# Patient Record
Sex: MALE | Race: WHITE | NOT HISPANIC OR LATINO | Employment: OTHER | URBAN - METROPOLITAN AREA
[De-identification: names, ages, dates, MRNs, and addresses within clinical notes are randomized per-mention and may not be internally consistent; named-entity substitution may affect disease eponyms.]

---

## 2017-11-17 ENCOUNTER — APPOINTMENT (OUTPATIENT)
Dept: OTHER | Facility: IMAGING CENTER | Age: 67
End: 2017-11-17
Payer: MEDICARE

## 2017-12-04 ENCOUNTER — APPOINTMENT (OUTPATIENT)
Dept: RADIOLOGY | Facility: MEDICAL CENTER | Age: 67
End: 2017-12-04
Attending: EMERGENCY MEDICINE
Payer: MEDICARE

## 2017-12-04 ENCOUNTER — HOSPITAL ENCOUNTER (OUTPATIENT)
Facility: MEDICAL CENTER | Age: 67
End: 2017-12-05
Attending: EMERGENCY MEDICINE | Admitting: FAMILY MEDICINE
Payer: MEDICARE

## 2017-12-04 ENCOUNTER — APPOINTMENT (OUTPATIENT)
Dept: RADIOLOGY | Facility: MEDICAL CENTER | Age: 67
End: 2017-12-04
Attending: FAMILY MEDICINE
Payer: MEDICARE

## 2017-12-04 ENCOUNTER — RESOLUTE PROFESSIONAL BILLING HOSPITAL PROF FEE (OUTPATIENT)
Dept: HOSPITALIST | Facility: MEDICAL CENTER | Age: 67
End: 2017-12-04
Payer: MEDICARE

## 2017-12-04 DIAGNOSIS — R55 SYNCOPE, UNSPECIFIED SYNCOPE TYPE: ICD-10-CM

## 2017-12-04 LAB
ALBUMIN SERPL BCP-MCNC: 3.9 G/DL (ref 3.2–4.9)
ALBUMIN/GLOB SERPL: 1.4 G/DL
ALP SERPL-CCNC: 48 U/L (ref 30–99)
ALT SERPL-CCNC: 14 U/L (ref 2–50)
ANION GAP SERPL CALC-SCNC: 11 MMOL/L (ref 0–11.9)
APTT PPP: 22.5 SEC (ref 24.7–36)
AST SERPL-CCNC: 16 U/L (ref 12–45)
BASOPHILS # BLD AUTO: 0.3 % (ref 0–1.8)
BASOPHILS # BLD: 0.02 K/UL (ref 0–0.12)
BILIRUB SERPL-MCNC: 0.8 MG/DL (ref 0.1–1.5)
BNP SERPL-MCNC: 23 PG/ML (ref 0–100)
BUN SERPL-MCNC: 10 MG/DL (ref 8–22)
CALCIUM SERPL-MCNC: 8.7 MG/DL (ref 8.5–10.5)
CHLORIDE SERPL-SCNC: 102 MMOL/L (ref 96–112)
CO2 SERPL-SCNC: 23 MMOL/L (ref 20–33)
CREAT SERPL-MCNC: 0.79 MG/DL (ref 0.5–1.4)
EOSINOPHIL # BLD AUTO: 0.24 K/UL (ref 0–0.51)
EOSINOPHIL NFR BLD: 3.8 % (ref 0–6.9)
ERYTHROCYTE [DISTWIDTH] IN BLOOD BY AUTOMATED COUNT: 43.5 FL (ref 35.9–50)
EST. AVERAGE GLUCOSE BLD GHB EST-MCNC: 111 MG/DL
GFR SERPL CREATININE-BSD FRML MDRD: >60 ML/MIN/1.73 M 2
GLOBULIN SER CALC-MCNC: 2.7 G/DL (ref 1.9–3.5)
GLUCOSE SERPL-MCNC: 91 MG/DL (ref 65–99)
HBA1C MFR BLD: 5.5 % (ref 0–5.6)
HCT VFR BLD AUTO: 44.8 % (ref 42–52)
HGB BLD-MCNC: 15.6 G/DL (ref 14–18)
IMM GRANULOCYTES # BLD AUTO: 0.04 K/UL (ref 0–0.11)
IMM GRANULOCYTES NFR BLD AUTO: 0.6 % (ref 0–0.9)
INR PPP: 1.07 (ref 0.87–1.13)
LIPASE SERPL-CCNC: 12 U/L (ref 11–82)
LYMPHOCYTES # BLD AUTO: 0.48 K/UL (ref 1–4.8)
LYMPHOCYTES NFR BLD: 7.5 % (ref 22–41)
MCH RBC QN AUTO: 32.8 PG (ref 27–33)
MCHC RBC AUTO-ENTMCNC: 34.8 G/DL (ref 33.7–35.3)
MCV RBC AUTO: 94.1 FL (ref 81.4–97.8)
MONOCYTES # BLD AUTO: 0.57 K/UL (ref 0–0.85)
MONOCYTES NFR BLD AUTO: 8.9 % (ref 0–13.4)
NEUTROPHILS # BLD AUTO: 5.03 K/UL (ref 1.82–7.42)
NEUTROPHILS NFR BLD: 78.9 % (ref 44–72)
NRBC # BLD AUTO: 0 K/UL
NRBC BLD AUTO-RTO: 0 /100 WBC
PLATELET # BLD AUTO: 160 K/UL (ref 164–446)
PMV BLD AUTO: 11.6 FL (ref 9–12.9)
POTASSIUM SERPL-SCNC: 3.7 MMOL/L (ref 3.6–5.5)
PROT SERPL-MCNC: 6.6 G/DL (ref 6–8.2)
PROTHROMBIN TIME: 13.6 SEC (ref 12–14.6)
RBC # BLD AUTO: 4.76 M/UL (ref 4.7–6.1)
SODIUM SERPL-SCNC: 136 MMOL/L (ref 135–145)
TROPONIN I SERPL-MCNC: 0.03 NG/ML (ref 0–0.04)
TSH SERPL DL<=0.005 MIU/L-ACNC: 4.64 UIU/ML (ref 0.3–3.7)
WBC # BLD AUTO: 6.4 K/UL (ref 4.8–10.8)

## 2017-12-04 PROCEDURE — 85610 PROTHROMBIN TIME: CPT

## 2017-12-04 PROCEDURE — 36415 COLL VENOUS BLD VENIPUNCTURE: CPT

## 2017-12-04 PROCEDURE — 70450 CT HEAD/BRAIN W/O DYE: CPT

## 2017-12-04 PROCEDURE — 99220 PR INITIAL OBSERVATION CARE,LEVL III: CPT | Performed by: FAMILY MEDICINE

## 2017-12-04 PROCEDURE — 84443 ASSAY THYROID STIM HORMONE: CPT

## 2017-12-04 PROCEDURE — 70551 MRI BRAIN STEM W/O DYE: CPT

## 2017-12-04 PROCEDURE — 83880 ASSAY OF NATRIURETIC PEPTIDE: CPT

## 2017-12-04 PROCEDURE — 85025 COMPLETE CBC W/AUTO DIFF WBC: CPT

## 2017-12-04 PROCEDURE — 84484 ASSAY OF TROPONIN QUANT: CPT

## 2017-12-04 PROCEDURE — 700105 HCHG RX REV CODE 258: Performed by: FAMILY MEDICINE

## 2017-12-04 PROCEDURE — 83690 ASSAY OF LIPASE: CPT

## 2017-12-04 PROCEDURE — 80053 COMPREHEN METABOLIC PANEL: CPT

## 2017-12-04 PROCEDURE — 99285 EMERGENCY DEPT VISIT HI MDM: CPT

## 2017-12-04 PROCEDURE — G0378 HOSPITAL OBSERVATION PER HR: HCPCS

## 2017-12-04 PROCEDURE — 71010 DX-CHEST-LIMITED (1 VIEW): CPT

## 2017-12-04 PROCEDURE — 304561 HCHG STAT O2

## 2017-12-04 PROCEDURE — 83036 HEMOGLOBIN GLYCOSYLATED A1C: CPT

## 2017-12-04 PROCEDURE — 85730 THROMBOPLASTIN TIME PARTIAL: CPT

## 2017-12-04 PROCEDURE — 93005 ELECTROCARDIOGRAM TRACING: CPT

## 2017-12-04 RX ORDER — B-COMPLEX WITH VITAMIN C
1 TABLET ORAL EVERY MORNING
COMMUNITY

## 2017-12-04 RX ORDER — BISACODYL 10 MG
10 SUPPOSITORY, RECTAL RECTAL
Status: DISCONTINUED | OUTPATIENT
Start: 2017-12-04 | End: 2017-12-05 | Stop reason: HOSPADM

## 2017-12-04 RX ORDER — POLYETHYLENE GLYCOL 3350 17 G/17G
1 POWDER, FOR SOLUTION ORAL
Status: DISCONTINUED | OUTPATIENT
Start: 2017-12-04 | End: 2017-12-05 | Stop reason: HOSPADM

## 2017-12-04 RX ORDER — ONDANSETRON 4 MG/1
4 TABLET, ORALLY DISINTEGRATING ORAL EVERY 4 HOURS PRN
Status: DISCONTINUED | OUTPATIENT
Start: 2017-12-04 | End: 2017-12-05 | Stop reason: HOSPADM

## 2017-12-04 RX ORDER — SODIUM CHLORIDE 9 MG/ML
INJECTION, SOLUTION INTRAVENOUS CONTINUOUS
Status: DISCONTINUED | OUTPATIENT
Start: 2017-12-04 | End: 2017-12-05 | Stop reason: HOSPADM

## 2017-12-04 RX ORDER — ONDANSETRON 2 MG/ML
4 INJECTION INTRAMUSCULAR; INTRAVENOUS EVERY 4 HOURS PRN
Status: DISCONTINUED | OUTPATIENT
Start: 2017-12-04 | End: 2017-12-05 | Stop reason: HOSPADM

## 2017-12-04 RX ORDER — ALBUTEROL SULFATE 90 UG/1
2 AEROSOL, METERED RESPIRATORY (INHALATION) EVERY 6 HOURS PRN
COMMUNITY

## 2017-12-04 RX ORDER — ACETAMINOPHEN 325 MG/1
650 TABLET ORAL EVERY 6 HOURS PRN
Status: DISCONTINUED | OUTPATIENT
Start: 2017-12-04 | End: 2017-12-05 | Stop reason: HOSPADM

## 2017-12-04 RX ORDER — AMOXICILLIN 250 MG
2 CAPSULE ORAL 2 TIMES DAILY
Status: DISCONTINUED | OUTPATIENT
Start: 2017-12-04 | End: 2017-12-05 | Stop reason: HOSPADM

## 2017-12-04 RX ADMIN — SODIUM CHLORIDE: 9 INJECTION, SOLUTION INTRAVENOUS at 17:34

## 2017-12-04 ASSESSMENT — LIFESTYLE VARIABLES
EVER FELT BAD OR GUILTY ABOUT YOUR DRINKING: NO
ALCOHOL_USE: YES
HAVE YOU EVER FELT YOU SHOULD CUT DOWN ON YOUR DRINKING: NO
TOTAL SCORE: 0
TOTAL SCORE: 0
CONSUMPTION TOTAL: POSITIVE
HOW MANY TIMES IN THE PAST YEAR HAVE YOU HAD 5 OR MORE DRINKS IN A DAY: 0
ON A TYPICAL DAY WHEN YOU DRINK ALCOHOL HOW MANY DRINKS DO YOU HAVE: 3
HAVE PEOPLE ANNOYED YOU BY CRITICIZING YOUR DRINKING: NO
TOTAL SCORE: 0
EVER_SMOKED: YES
EVER HAD A DRINK FIRST THING IN THE MORNING TO STEADY YOUR NERVES TO GET RID OF A HANGOVER: NO
AVERAGE NUMBER OF DAYS PER WEEK YOU HAVE A DRINK CONTAINING ALCOHOL: 7

## 2017-12-04 ASSESSMENT — PAIN SCALES - GENERAL
PAINLEVEL_OUTOF10: 0

## 2017-12-04 NOTE — ED NOTES
"Chief Complaint   Patient presents with   • Syncope     [facility]. Pt at home and had syncopal event with wife witness. Pt denies any SOB or CP. Pt AAO x4. Initial EMS ECG abnormal, EKG completed here and seen by ERP. Blood pressure 127/78, pulse 70, temperature 36.5 °C (97.7 °F), resp. rate 14, height 1.753 m (5' 9\"), weight 78.9 kg (173 lb 15.1 oz), SpO2 98 %.       "

## 2017-12-04 NOTE — ED PROVIDER NOTES
ED Provider Note    CHIEF COMPLAINT  Chief Complaint   Patient presents with   • Syncope       HPI  Roland Hussein is a 67 y.o. male who presentsFor evaluation of syncope. The patient is here with his wife. He states that he worked this morning and then went home and about 10. Twice states that he said he was hungry so he made some food and he sat down but really didn't eat anything.  She knew she heard him saline and she looked over at him and he was slumped back in the chair with his head back he was pale and diaphoretic and unresponsive and she states that she thought he was dead. He had no seizure activity. She states that he was completely unremarkable responsive for a minute or more. When he gradually became responsive he was able to sit up it was too weak to stand and he seemed confused. It sounds like he has had multiple episodes of syncope throughout his life but none for quite a few years. He states he feels a little tired now but absolutely fine otherwise.    REVIEW OF SYSTEMS  See HPI for further details. All other systems are negative.     PAST MEDICAL HISTORY  History reviewed. No pertinent past medical history.    FAMILY HISTORY  History reviewed. No pertinent family history.    SOCIAL HISTORY  Social History     Social History   • Marital status:      Spouse name: N/A   • Number of children: N/A   • Years of education: N/A     Social History Main Topics   • Smoking status: Never Smoker   • Smokeless tobacco: Never Used   • Alcohol use Yes      Comment: occ   • Drug use: No   • Sexual activity: Not on file     Other Topics Concern   • Not on file     Social History Narrative   • No narrative on file       SURGICAL HISTORY  History reviewed. No pertinent surgical history.    CURRENT MEDICATIONS  Home Medications    **Home medications have not yet been reviewed for this encounter**         ALLERGIES  No Known Allergies    PHYSICAL EXAM  VITAL SIGNS: /78   Pulse 84   Temp 36.5 °C (97.7  "°F)   Resp (!) 22   Ht 1.753 m (5' 9\")   Wt 78.9 kg (173 lb 15.1 oz)   SpO2 95%   BMI 25.69 kg/m²     Constitutional: Well developed, Well nourished, No acute distress, Non-toxic appearance.   HENT: Normocephalic, Atraumatic.   Eyes: PERRL, EOMI, Conjunctiva normal, No discharge.   Cardiovascular: Normal heart rate, Normal rhythm, No murmurs, No rubs, No gallops.   Thorax & Lungs: Lungs clear to auscultation bilaterally without wheezes, rales or rhonchi. No respiratory distress.    Abdomen:Soft and nontender.  Skin: Warm, Dry.   Extremities:  No edema, No cyanosis. No calf tenderness or swelling.  Musculoskeletal: Good range of motion in all major joints.  Neurologic: Awake alert and oriented x 3. Cranial nerves II through XII are intact. Normal motor function, No focal deficits noted.       EKG  EKG Interpretation    Interpreted by emergency department physician    Rhythm: normal sinus   Rate: normal  Axis: normal  Ectopy: none  Conduction: normal  ST Segments: normal  T Waves: normal  Q Waves: none    Clinical Impression: no acute changes        RADIOLOGY/PROCEDURES  DX-CHEST-LIMITED (1 VIEW)   Final Result      Mild bilateral lung base atelectasis with mild edema or pneumonitis not excluded.      CT-HEAD W/O    (Results Pending)         COURSE & MEDICAL DECISION MAKING  Pertinent Labs & Imaging studies reviewed. (See chart for details)  This is a 67-year-old here for evaluation of syncope. Currently he feels somewhat tired but otherwise is feeling fine. He has syncopal episode while sitting today. This was witnessed by his wife. He had no seizure activity. His evaluation here includes an EKG which is normal. Chest x-ray shows some basilar atelectasis. Laboratories include a troponin I which is normal. BMP is normal. CBC and chemistries are normal. The patient has been monitored throughout his stay in the emergency Department. There has been no noted ectopy. I discussed results of the test with the patient. " I'm very concerned about this patient having a syncopal episode while sitting. Concerned that this likely represents a dysrhythmia in this patient. I discussed case with Dr. Monteiro of the hospitalist service and he will be the primary admitting physician.    FINAL IMPRESSION  1. Syncope  2. Thrombocytopenia  3.         Electronically signed by: Mu Waters, 12/4/2017 1:05 PM

## 2017-12-05 VITALS
RESPIRATION RATE: 16 BRPM | OXYGEN SATURATION: 94 % | BODY MASS INDEX: 25.76 KG/M2 | DIASTOLIC BLOOD PRESSURE: 89 MMHG | SYSTOLIC BLOOD PRESSURE: 143 MMHG | TEMPERATURE: 98.4 F | HEIGHT: 69 IN | WEIGHT: 173.94 LBS | HEART RATE: 74 BPM

## 2017-12-05 PROBLEM — R55 NEAR SYNCOPE: Status: RESOLVED | Noted: 2017-12-04 | Resolved: 2017-12-05

## 2017-12-05 LAB
ANION GAP SERPL CALC-SCNC: 7 MMOL/L (ref 0–11.9)
BASOPHILS # BLD AUTO: 0.7 % (ref 0–1.8)
BASOPHILS # BLD: 0.03 K/UL (ref 0–0.12)
BUN SERPL-MCNC: 10 MG/DL (ref 8–22)
CALCIUM SERPL-MCNC: 8.7 MG/DL (ref 8.5–10.5)
CHLORIDE SERPL-SCNC: 105 MMOL/L (ref 96–112)
CHOLEST SERPL-MCNC: 202 MG/DL (ref 100–199)
CO2 SERPL-SCNC: 23 MMOL/L (ref 20–33)
CREAT SERPL-MCNC: 0.64 MG/DL (ref 0.5–1.4)
EOSINOPHIL # BLD AUTO: 0.39 K/UL (ref 0–0.51)
EOSINOPHIL NFR BLD: 8.8 % (ref 0–6.9)
ERYTHROCYTE [DISTWIDTH] IN BLOOD BY AUTOMATED COUNT: 43 FL (ref 35.9–50)
GFR SERPL CREATININE-BSD FRML MDRD: >60 ML/MIN/1.73 M 2
GLUCOSE SERPL-MCNC: 85 MG/DL (ref 65–99)
HCT VFR BLD AUTO: 44.1 % (ref 42–52)
HDLC SERPL-MCNC: 65 MG/DL
HGB BLD-MCNC: 15.4 G/DL (ref 14–18)
IMM GRANULOCYTES # BLD AUTO: 0.01 K/UL (ref 0–0.11)
IMM GRANULOCYTES NFR BLD AUTO: 0.2 % (ref 0–0.9)
LDLC SERPL CALC-MCNC: 124 MG/DL
LV EJECT FRACT  99904: 75
LYMPHOCYTES # BLD AUTO: 0.71 K/UL (ref 1–4.8)
LYMPHOCYTES NFR BLD: 16 % (ref 22–41)
MCH RBC QN AUTO: 32.3 PG (ref 27–33)
MCHC RBC AUTO-ENTMCNC: 34.9 G/DL (ref 33.7–35.3)
MCV RBC AUTO: 92.5 FL (ref 81.4–97.8)
MONOCYTES # BLD AUTO: 0.59 K/UL (ref 0–0.85)
MONOCYTES NFR BLD AUTO: 13.3 % (ref 0–13.4)
NEUTROPHILS # BLD AUTO: 2.7 K/UL (ref 1.82–7.42)
NEUTROPHILS NFR BLD: 61 % (ref 44–72)
NRBC # BLD AUTO: 0 K/UL
NRBC BLD AUTO-RTO: 0 /100 WBC
PLATELET # BLD AUTO: 158 K/UL (ref 164–446)
PMV BLD AUTO: 10.3 FL (ref 9–12.9)
POTASSIUM SERPL-SCNC: 3.9 MMOL/L (ref 3.6–5.5)
RBC # BLD AUTO: 4.77 M/UL (ref 4.7–6.1)
SODIUM SERPL-SCNC: 135 MMOL/L (ref 135–145)
TRIGL SERPL-MCNC: 66 MG/DL (ref 0–149)
WBC # BLD AUTO: 4.4 K/UL (ref 4.8–10.8)

## 2017-12-05 PROCEDURE — 93306 TTE W/DOPPLER COMPLETE: CPT | Mod: 26 | Performed by: INTERNAL MEDICINE

## 2017-12-05 PROCEDURE — 93880 EXTRACRANIAL BILAT STUDY: CPT

## 2017-12-05 PROCEDURE — 93880 EXTRACRANIAL BILAT STUDY: CPT | Mod: 26 | Performed by: SURGERY

## 2017-12-05 PROCEDURE — 80061 LIPID PANEL: CPT

## 2017-12-05 PROCEDURE — 99217 PR OBSERVATION CARE DISCHARGE: CPT | Performed by: HOSPITALIST

## 2017-12-05 PROCEDURE — G0378 HOSPITAL OBSERVATION PER HR: HCPCS

## 2017-12-05 PROCEDURE — 93306 TTE W/DOPPLER COMPLETE: CPT

## 2017-12-05 PROCEDURE — 85025 COMPLETE CBC W/AUTO DIFF WBC: CPT

## 2017-12-05 PROCEDURE — 80048 BASIC METABOLIC PNL TOTAL CA: CPT

## 2017-12-05 ASSESSMENT — PAIN SCALES - GENERAL: PAINLEVEL_OUTOF10: 0

## 2017-12-05 ASSESSMENT — LIFESTYLE VARIABLES: EVER_SMOKED: YES

## 2017-12-05 NOTE — DISCHARGE SUMMARY
Hospital Medicine Discharge Note     Patient ID:  Roland Hussein  [id number]  67 y.o.male  1950    Admit Date:  12/4/2017       Discharge Date:   12/05/2017     Primary Care Provider: No primary care provider on file.    Admitting Physician: Abelino Zhu M.D.  Discharging Physician: Nicolette Cortes M.D.     Chief Complaint: Near syncope    Discharge Diagnoses:   Active Problems:    Near syncope- Resolve no recurrence of symptoms.       Chronic Medical Problems:  History reviewed. No pertinent past medical history.    Code Status: Full Code    Hospital Summary:       Please refer to H&P by Abelino Zhu M.D on 12/04/2017 for full details.      In brief, Roland Hussein is a 67 y.o. male who was admitted 12/4/2017 for near syncope.  [facility], he has a history of vitamin D deficiency and hyperlipidemia.   Patient is a  who had returned home after looking at some houses around 10 Am, when he suddenly felt dizzy and lightheaded and nearly passed out. He laid on the floor for about 5-6 minutes, he states he did not hit his head. Patient had previous episode similar to this about 20 years ago and was concerned so he presented to the ER for evaluation.    Patient was admitted to CDU in observation status. He was medically optimized with  IV fluids, and symptom management. He was monitored on telemetry without any acute events. MRI brain, echocardiogram, carotid ultrasound were all evaluated and found to be negative with no abnormal results. Lipid panel was checked and is borderline elevated, patient takes red yeast rice supplement to help with this outpatient, and will follow-up with his PCP. TSH was also evaluated and found to be elevated at 4.64. Spoke with patient at length about this and he will be following up with his PCP for reevaluation and possible medical management. No clear cardiac or neurological source of his near syncope was determined. His  symptoms have resolved at this point, and he states he is wanting to go home.  On exam this morning patient states he has no chest pain, headache, shortness of breath, abdominal pain, nausea, vomiting or dizziness. Patient states complete resolution of all symptoms. His physical exam is within normal limits. Discussed with patient regarding his hyperlipidemia and elevated TSH and he will be following up with his PCP for close monitoring.     Therefore, he is discharged in good and stable condition with close outpatient follow-up.    Consultants:      None    Studies:    Imaging/ Testing:      CAROTID DUPLEX ([facility] [place] Only)   Final Result   CONCLUSIONS   minimal bilateral ICA thickening   nl bilateral subclavians and vertebrals   ECHOCARDIOGRAM COMP W/O CONT   Final Result   CONCLUSIONS  No prior study is available for comparison.   Left ventricle is small in size.  Normal left ventricular wall thickness, systolic function, and   diastolic function.  Left ventricular ejection fraction is estimated to be >75%.  Grossly normal regional wall motion.  Structurally normal aortic and mitral valve without significant   stenosis or regurgitation.  Trace tricuspid regurgitation.  Estimated right ventricular systolic pressure  is 30 mmHg.  Normal inferior vena cava size and inspiratory collapse.  Normal pericardium without effusion.   MR-BRAIN-W/O   Final Result      1.  MRI of the brain without contrast within normal limits for age with mild atrophy and mild white matter changes.   2.  Chronic sinus disease with prior BILATERAL maxillary antrostomy      CT-HEAD W/O   Final Result      1. Mild cerebral atrophy.      2. Subtle white matter lucencies most consistent with small vessel ischemic change versus demyelination or gliosis.      3.  Otherwise, Head CT without contrast with no acute findings. No evidence of acute cerebral  hemorrhage or mass lesion.      DX-CHEST-LIMITED (1 VIEW)   Final Result      Mild  bilateral lung base atelectasis with mild edema or pneumonitis not excluded.            Laboratory:   Recent Labs      12/04/17   1142  12/05/17   0551   WBC  6.4  4.4*   RBC  4.76  4.77   HEMOGLOBIN  15.6  15.4   HEMATOCRIT  44.8  44.1   MCV  94.1  92.5   MCH  32.8  32.3   MCHC  34.8  34.9   RDW  43.5  43.0   PLATELETCT  160*  158*   MPV  11.6  10.3       Recent Labs      12/04/17   1142  12/05/17   0551   SODIUM  136  135   POTASSIUM  3.7  3.9   CHLORIDE  102  105   CO2  23  23   GLUCOSE  91  85   BUN  10  10   CREATININE  0.79  0.64   CALCIUM  8.7  8.7       Recent Labs      12/04/17   1142  12/05/17   0551   ALTSGPT  14   --    ASTSGOT  16   --    ALKPHOSPHAT  48   --    TBILIRUBIN  0.8   --    LIPASE  12   --    GLUCOSE  91  85        Recent Labs      12/04/17   1142   BNPBTYPENAT  23       Recent Labs      12/05/17   0551   TRIGLYCERIDE  66   HDL  65   LDL  124*       Recent Labs      12/04/17   1142   TROPONINI  0.03       Recent Labs      12/04/17   1142   TSHULTRASEN  4.640*         Procedures/Surgeries:        none    Disposition:    Discharge home with wife    Condition:  Stable    Instructions:   Activity: As tolerated.  Diet: Regular   Followup: With PCP  Instructions:  -Please discuss with your PCP elevated TSH and Lipids   -Given instructions to return to the ER if any new or worsening symptoms, worsening condition, or failure to improve  -Call PCP for followup  -No smoking, no alcohol, no caffeine  -Encourage risk factor reduction with tobacco and alcohol abstinence, diet changes, weight loss, and exercise.     Follow-Up  No follow-up provider specified.  [facility].     Discharge Medications:           Medication List      CONTINUE taking these medications      Instructions   albuterol 108 (90 Base) MCG/ACT Aers inhalation aerosol   Inhale 2 Puffs by mouth every 6 hours as needed for Shortness of Breath.  Dose:  2 Puff     RED YEAST RICE PO   Take  1 Cap by mouth every morning.  Dose:  1 Cap     Vitamin B Complex Tabs   Take 1 Tab by mouth every morning.  Dose:  1 Tab     vitamin D 1000 UNIT Tabs  Commonly known as:  cholecalciferol   Take 1,000 Units by mouth every morning.  Dose:  1000 Units            Please CC the above physicians    ADIEL Diallo  12/5/2017  11:33 AM

## 2017-12-05 NOTE — DISCHARGE INSTRUCTIONS
Discharge Instructions    Discharged to home by car with relative. Discharged via wheelchair, hospital escort: Yes.  Special equipment needed: Not Applicable    Be sure to schedule a follow-up appointment with your primary care doctor or any specialists as instructed.     Discharge Plan:   Diet Plan: Discussed  Activity Level: Discussed  Smoking Cessation Offered: Patient Refused  Confirmed Follow up Appointment: Patient to Call and Schedule Appointment  Confirmed Symptoms Management: Discussed  Medication Reconciliation Updated: Yes  Influenza Vaccine Indication: Patient Refuses    I understand that a diet low in cholesterol, fat, and sodium is recommended for good health. Unless I have been given specific instructions below for another diet, I accept this instruction as my diet prescription.   Other diet: Regular    Special Instructions: None    · Is patient discharged on Warfarin / Coumadin?   No     · Is patient Post Blood Transfusion?  No    Depression / Suicide Risk    As you are discharged from this [facility] facility, it is important to learn how to keep safe from harming yourself.    Recognize the warning signs:  · Abrupt changes in personality, positive or negative- including increase in energy   · Giving away possessions  · Change in eating patterns- significant weight changes-  positive or negative  · Change in sleeping patterns- unable to sleep or sleeping all the time   · Unwillingness or inability to communicate  · Depression  · Unusual sadness, discouragement and loneliness  · Talk of wanting to die  · Neglect of personal appearance   · Rebelliousness- reckless behavior  · Withdrawal from people/activities they love  · Confusion- inability to concentrate     If you or a loved one observes any of these behaviors or has concerns about self-harm, here's what you can do:  · Talk about it- your feelings and reasons for harming yourself  · Remove any means that you might use to hurt yourself (examples:  pills, rope, extension cords, firearm)  · Get professional help from the community (Mental Health, Substance Abuse, psychological counseling)  · Do not be alone:Call your Safe Contact- someone whom you trust who will be there for you.  · Call your local CRISIS HOTLINE 941-6956 or 911-952-8199  · Call your local Children's Mobile Crisis Response Team [place] (133) 819-6929 or www.[employer]  · Call the toll free National Suicide Prevention Hotlines   · National Suicide Prevention Lifeline 497-103-XCCF (6918)  · National Hope Line Network 800-SUICIDE (523-7354)

## 2017-12-05 NOTE — PROGRESS NOTES
Pt to unit via wheelchair. Pt ambulate to bed with steady gait. Pt denies any dizziness. Tele monitor on. Pt educated on Lovenox; pt refuse. MRI screening sheet completed and faxed to MRI. Call light and personal belongings within reach. Pt denies any needs at this time. Will continue to monitor.

## 2017-12-05 NOTE — H&P
DOS: 12/4/2017    PATIENT ID:  NAME:  Roland Hussein  MRN:               [id number]  YOB: 1950    PMD: No primary care provider on file.    CC: Near syncope    HPI: Roland Hussein is a 67 y.o. male who presents with near syncope, patient states that  back to the house around 10 AM after looking at some job sites he is a . He was in a counter both have breakfast when he suddenly felt dizzy and lightheaded where he had nearly passed out. He was in the floor for about 5 or 6 minutes, states he did not hit his head. Denies any headache chest pain palpitations shortness of breath prior to the episode. Patient states he woke up around 5 AM this morning which is not unusual for him and sometimes he does skip breakfast. He has had these episodes happen over the years. He has not done any previous workup for this. He has had some congestion over the past 2 days but denies any fever or chills, nausea vomiting or diarrhea.                REVIEW OF SYSTEMS  A full review of systems was completed and all pertinent positives and negatives are included in the HPI above by AMA/CMS criteria.    PAST MEDICAL HISTORY  History reviewed. No pertinent past medical history.    PAST SURGICAL HISTORY  Past Surgical History:   Procedure Laterality Date   • ACL REPAIR Left    • HERNIA REPAIR         FAMILY HISTORY  History reviewed. No pertinent family history.    SOCIAL HISTORY  Social History   Substance Use Topics   • Smoking status: Never Smoker   • Smokeless tobacco: Never Used   • Alcohol use Yes      Comment: occ       ALLERGIES  Allergies as of 12/04/2017   • (No Known Allergies)       OUTPATIENT MEDICATIONS     Medication List      ASK your doctor about these medications      Instructions   albuterol 108 (90 Base) MCG/ACT Aers inhalation aerosol   Inhale 2 Puffs by mouth every 6 hours as needed for Shortness of Breath.  Dose:  2 Puff     RED YEAST RICE PO   Take 1 Cap by mouth every morning.  Dose:   "1 Cap     Vitamin B Complex Tabs   Take 1 Tab by mouth every morning.  Dose:  1 Tab     vitamin D 1000 UNIT Tabs  Commonly known as:  cholecalciferol   Take 1,000 Units by mouth every morning.  Dose:  1000 Units            PHYSICAL EXAM:  Blood pressure 127/78, pulse 78, temperature 36.5 °C (97.7 °F), resp. rate (!) 24, height 1.753 m (5' 9\"), weight 78.9 kg (173 lb 15.1 oz), SpO2 94 %.  Oxygen: Pulse Oximetry: 94 %, O2 (LPM): 2, O2 Delivery: Nasal Cannula    Gen: NAD, comfortable  HEENT: NCAT, PERRL, EOMI, Oropharynx moist and clear, no LAD, no JVD, neck supple  Chest: no accessory muscle use, CTAB  CV: RRR, S1 and S2 distinct, no murmur  GI: +BS, soft, NT, ND, no rebound/guarding, no hepatosplenomegaly  Extremities: Warm, well-perfused, no cyanosis/clubbing, no peripheral edema, distal pulses are intact  Neuro: AO x 3, CN II-XII grossly intact, MMT 5/5, no sensory deficits    LAB TESTS and IMAGES:   Recent Labs      12/04/17   1142   WBC  6.4   RBC  4.76   HEMOGLOBIN  15.6   HEMATOCRIT  44.8   MCV  94.1   MCH  32.8   RDW  43.5   PLATELETCT  160*   MPV  11.6   NEUTSPOLYS  78.90*   LYMPHOCYTES  7.50*   MONOCYTES  8.90   EOSINOPHILS  3.80   BASOPHILS  0.30     Recent Labs      12/04/17   1142   TROPONINI  0.03   BNPBTYPENAT  23     Recent Labs      12/04/17   1142   APTT  22.5*   INR  1.07     Recent Labs      12/04/17   1142   SODIUM  136   POTASSIUM  3.7   CHLORIDE  102   CO2  23   BUN  10   CREATININE  0.79   CALCIUM  8.7   ALBUMIN  3.9     Estimated GFR/CRCL = Estimated Creatinine Clearance: 88.3 mL/min (by C-G formula based on SCr of 0.79 mg/dL).  Recent Labs      12/04/17   1142   GLUCOSE  91     No results found for: HBA1C, TSH, FREET4, FREET3, CORTISOL  Recent Labs      12/04/17   1142   ASTSGOT  16   ALTSGPT  14   TBILIRUBIN  0.8   ALKPHOSPHAT  48   GLOBULIN  2.7   INR  1.07           Invalid input(s): [id number]  No results found for: OTBLLICB94, FOLATE, FERRITIN, IRON, TOTIRONBC  Ct-head W/o    Result " Date: 12/4/2017 12/4/2017 2:18 PM HISTORY/REASON FOR EXAM:  Syncope. TECHNIQUE/EXAM DESCRIPTION AND NUMBER OF VIEWS: CT of the head without contrast. The study was performed on a helical multidetector CT scanner. Contiguous 2.5 mm axial sections were obtained from the skull base through the vertex. Up to date radiation dose reduction adjustments have been utilized to meet ALARA standards for radiation dose reduction. COMPARISON:  None available FINDINGS: The calvariae and skull base are unremarkable. There are no extraaxial fluid collections. There is a pattern of cerebral atrophy manifest as enlargement of sulcal markings and ventricular prominence.  The ventricular system and basal cisterns are otherwise unremarkable. There are areas of hypodensity in the white matter most consistent with small vessel ischemic change versus demyelination or gliosis. There are no hemorrhagic lesions. There are no mass effects or shift of midline structures. Marked paranasal sinus mucosal thickening. Mastoids in the field of view are unremarkable.     1. Mild cerebral atrophy. 2. Subtle white matter lucencies most consistent with small vessel ischemic change versus demyelination or gliosis. 3.  Otherwise, Head CT without contrast with no acute findings. No evidence of acute cerebral  hemorrhage or mass lesion.    Dx-chest-limited (1 View)    Result Date: 12/4/2017 12/4/2017 12:51 PM HISTORY/REASON FOR EXAM: Syncope. TECHNIQUE/EXAM DESCRIPTION AND NUMBER OF VIEWS: Single AP view of the chest. COMPARISON: 2/8/2007 FINDINGS: Bilateral lung base atelectasis. Mild edema or pneumonitis not excluded. The heart is normal in size. There is no pleural effusion.     Mild bilateral lung base atelectasis with mild edema or pneumonitis not excluded.      ASSESSMENT/PLAN:     1.  Near syncope. Admit to monitored telemetry bed, check MRI brain, echocardiogram, carotid.  2.  Hyperlipidemia. Check lipid profile    PPX: Lovenox    CODE STATUS:  Full    Anticipate that patient will need less than 2 midnights for management of the above discussed medical issues.    This dictation was created using voice recognition software. The accuracy of the dictation is limited to the abilities of the software. I expect there may be some errors of grammar and possibly content.

## 2017-12-05 NOTE — PROGRESS NOTES
Discharge instructions, medications and follow-up reviewed with pt, pt verbalized understanding and denies questions. Discharge paperwork given to pt. PIV removed, armband removed. Pt awaiting transport.

## 2017-12-05 NOTE — PROGRESS NOTES
Pt PIV leaking. Fluids stopped. Per CUATE Chaves IV not necessary at this time, okay to remove. PIV removed.

## 2017-12-05 NOTE — PROGRESS NOTES
Pt with orders to go to MRI, paged hospitalist @ 2019.  Returned call from Dr. Barkley @ 2025.  Per MD ok for pt to be off tele for MRI.  Orders placed.  Will continue to monitor.

## 2021-01-15 DIAGNOSIS — Z23 NEED FOR VACCINATION: ICD-10-CM

## 2021-05-17 ENCOUNTER — PATIENT OUTREACH (OUTPATIENT)
Dept: HEALTH INFORMATION MANAGEMENT | Facility: OTHER | Age: 71
End: 2021-05-17

## 2021-05-17 NOTE — PROGRESS NOTES
Outreach-Comprehensive Health Assessment. Scheduled for 5/27/21 at 8:00 am with Dr. Burgess at [address] [place] [address]. Verified HIPAA, no questions or concerns. Reminder sent to home.      Attempt #1

## 2021-05-27 PROBLEM — J45.20 MILD INTERMITTENT ASTHMA WITHOUT COMPLICATION: Status: ACTIVE | Noted: 2021-05-27

## 2021-05-27 PROBLEM — J45.40 MODERATE PERSISTENT ASTHMA WITHOUT COMPLICATION: Status: ACTIVE | Noted: 2021-05-27

## 2021-05-27 PROBLEM — R94.30 NONSPECIFIC ABNORMAL FUNCTION STUDY, CARDIOVASCULAR: Status: ACTIVE | Noted: 2021-05-27

## 2022-06-20 ENCOUNTER — TELEPHONE (OUTPATIENT)
Dept: HEALTH INFORMATION MANAGEMENT | Facility: OTHER | Age: 72
End: 2022-06-20
Payer: MEDICARE

## 2022-11-08 ENCOUNTER — DOCUMENTATION (OUTPATIENT)
Dept: HEALTH INFORMATION MANAGEMENT | Facility: OTHER | Age: 72
End: 2022-11-08
Payer: MEDICARE

## 2023-03-31 ENCOUNTER — APPOINTMENT (OUTPATIENT)
Dept: RADIOLOGY | Facility: MEDICAL CENTER | Age: 73
End: 2023-03-31
Attending: EMERGENCY MEDICINE
Payer: MEDICARE

## 2023-03-31 ENCOUNTER — HOSPITAL ENCOUNTER (EMERGENCY)
Facility: MEDICAL CENTER | Age: 73
End: 2023-04-01
Attending: EMERGENCY MEDICINE
Payer: MEDICARE

## 2023-03-31 DIAGNOSIS — R55 SYNCOPE, UNSPECIFIED SYNCOPE TYPE: ICD-10-CM

## 2023-03-31 DIAGNOSIS — F19.10 POLYSUBSTANCE ABUSE (HCC): ICD-10-CM

## 2023-03-31 LAB
ALBUMIN SERPL BCP-MCNC: 3.9 G/DL (ref 3.2–4.9)
ALBUMIN/GLOB SERPL: 1.6 G/DL
ALP SERPL-CCNC: 50 U/L (ref 30–99)
ALT SERPL-CCNC: 17 U/L (ref 2–50)
ANION GAP SERPL CALC-SCNC: 12 MMOL/L (ref 7–16)
APTT PPP: 20.8 SEC (ref 24.7–36)
AST SERPL-CCNC: 20 U/L (ref 12–45)
BASOPHILS # BLD AUTO: 0.8 % (ref 0–1.8)
BASOPHILS # BLD: 0.05 K/UL (ref 0–0.12)
BILIRUB SERPL-MCNC: 0.4 MG/DL (ref 0.1–1.5)
BUN SERPL-MCNC: 14 MG/DL (ref 8–22)
CALCIUM ALBUM COR SERPL-MCNC: 8.9 MG/DL (ref 8.5–10.5)
CALCIUM SERPL-MCNC: 8.8 MG/DL (ref 8.4–10.2)
CHLORIDE SERPL-SCNC: 105 MMOL/L (ref 96–112)
CO2 SERPL-SCNC: 23 MMOL/L (ref 20–33)
CREAT SERPL-MCNC: 0.73 MG/DL (ref 0.5–1.4)
EKG IMPRESSION: NORMAL
EOSINOPHIL # BLD AUTO: 0.38 K/UL (ref 0–0.51)
EOSINOPHIL NFR BLD: 6.4 % (ref 0–6.9)
ERYTHROCYTE [DISTWIDTH] IN BLOOD BY AUTOMATED COUNT: 45.1 FL (ref 35.9–50)
ETHANOL BLD-MCNC: 64 MG/DL
GFR SERPLBLD CREATININE-BSD FMLA CKD-EPI: 95 ML/MIN/1.73 M 2
GLOBULIN SER CALC-MCNC: 2.5 G/DL (ref 1.9–3.5)
GLUCOSE SERPL-MCNC: 86 MG/DL (ref 65–99)
HCT VFR BLD AUTO: 43.8 % (ref 42–52)
HGB BLD-MCNC: 15 G/DL (ref 14–18)
IMM GRANULOCYTES # BLD AUTO: 0.06 K/UL (ref 0–0.11)
IMM GRANULOCYTES NFR BLD AUTO: 1 % (ref 0–0.9)
INR PPP: 1.01 (ref 0.87–1.13)
LYMPHOCYTES # BLD AUTO: 2.27 K/UL (ref 1–4.8)
LYMPHOCYTES NFR BLD: 38.3 % (ref 22–41)
MCH RBC QN AUTO: 32.6 PG (ref 27–33)
MCHC RBC AUTO-ENTMCNC: 34.2 G/DL (ref 33.7–35.3)
MCV RBC AUTO: 95.2 FL (ref 81.4–97.8)
MONOCYTES # BLD AUTO: 0.52 K/UL (ref 0–0.85)
MONOCYTES NFR BLD AUTO: 8.8 % (ref 0–13.4)
NEUTROPHILS # BLD AUTO: 2.65 K/UL (ref 1.82–7.42)
NEUTROPHILS NFR BLD: 44.7 % (ref 44–72)
NRBC # BLD AUTO: 0 K/UL
NRBC BLD-RTO: 0 /100 WBC
PLATELET # BLD AUTO: 179 K/UL (ref 164–446)
PMV BLD AUTO: 10.2 FL (ref 9–12.9)
POTASSIUM SERPL-SCNC: 3.7 MMOL/L (ref 3.6–5.5)
PROT SERPL-MCNC: 6.4 G/DL (ref 6–8.2)
PROTHROMBIN TIME: 13.2 SEC (ref 12–14.6)
RBC # BLD AUTO: 4.6 M/UL (ref 4.7–6.1)
SODIUM SERPL-SCNC: 140 MMOL/L (ref 135–145)
TROPONIN T SERPL-MCNC: 10 NG/L (ref 6–19)
TROPONIN T SERPL-MCNC: 10 NG/L (ref 6–19)
WBC # BLD AUTO: 5.9 K/UL (ref 4.8–10.8)

## 2023-03-31 PROCEDURE — 85730 THROMBOPLASTIN TIME PARTIAL: CPT

## 2023-03-31 PROCEDURE — 700105 HCHG RX REV CODE 258: Performed by: EMERGENCY MEDICINE

## 2023-03-31 PROCEDURE — 36415 COLL VENOUS BLD VENIPUNCTURE: CPT

## 2023-03-31 PROCEDURE — 71045 X-RAY EXAM CHEST 1 VIEW: CPT

## 2023-03-31 PROCEDURE — 99285 EMERGENCY DEPT VISIT HI MDM: CPT

## 2023-03-31 PROCEDURE — 85610 PROTHROMBIN TIME: CPT

## 2023-03-31 PROCEDURE — 70450 CT HEAD/BRAIN W/O DYE: CPT

## 2023-03-31 PROCEDURE — 84484 ASSAY OF TROPONIN QUANT: CPT | Mod: 91

## 2023-03-31 PROCEDURE — 82077 ASSAY SPEC XCP UR&BREATH IA: CPT

## 2023-03-31 PROCEDURE — 85025 COMPLETE CBC W/AUTO DIFF WBC: CPT

## 2023-03-31 PROCEDURE — 80053 COMPREHEN METABOLIC PANEL: CPT

## 2023-03-31 PROCEDURE — 93005 ELECTROCARDIOGRAM TRACING: CPT

## 2023-03-31 RX ORDER — SODIUM CHLORIDE, SODIUM LACTATE, POTASSIUM CHLORIDE, CALCIUM CHLORIDE 600; 310; 30; 20 MG/100ML; MG/100ML; MG/100ML; MG/100ML
1000 INJECTION, SOLUTION INTRAVENOUS ONCE
Status: COMPLETED | OUTPATIENT
Start: 2023-03-31 | End: 2023-03-31

## 2023-03-31 RX ADMIN — SODIUM CHLORIDE, POTASSIUM CHLORIDE, SODIUM LACTATE AND CALCIUM CHLORIDE 1000 ML: 600; 310; 30; 20 INJECTION, SOLUTION INTRAVENOUS at 22:15

## 2023-03-31 ASSESSMENT — HEART SCORE
ECG: NON-SPECIFIC REPOLARIZATION DISTURBANCE
TROPONIN: LESS THAN OR EQUAL TO NORMAL LIMIT
HEART SCORE: 3
RISK FACTORS: NO KNOWN RISK FACTORS
AGE: 65+
HISTORY: SLIGHTLY SUSPICIOUS

## 2023-04-01 VITALS
RESPIRATION RATE: 16 BRPM | SYSTOLIC BLOOD PRESSURE: 144 MMHG | HEART RATE: 75 BPM | WEIGHT: 165 LBS | HEIGHT: 69 IN | BODY MASS INDEX: 24.44 KG/M2 | DIASTOLIC BLOOD PRESSURE: 90 MMHG | TEMPERATURE: 97 F | OXYGEN SATURATION: 98 %

## 2023-04-01 NOTE — DISCHARGE INSTRUCTIONS
Do not mix marijuana and alcohol ever again.  Return for any chest pains extremity weakness dizziness shortness of breath or any worsening symptoms.

## 2023-04-01 NOTE — ED PROVIDER NOTES
"ED Provider Note    CHIEF COMPLAINT  Chief Complaint   Patient presents with    Syncope     Pt was a family event where he \"stared into space\" and then fainted per family report  Pt was assisted to by family member to keep him from falling to the ground   Pt states that the dizziness is still present and worse with movement   Pt denies any medical history or daily medications  Pt was drinking alcohol and smoking marijuana          EXTERNAL RECORDS REVIEWED  Outpatient labs & studies patient had an echocardiogram 12/2017 which showed an ejection fraction of 75% no cardiac wall dysfunction no abnormal LV thickness    HPI/ROS  LIMITATION TO HISTORY   Select: : None  OUTSIDE HISTORIAN(S):  Family the patient's daughter states that he was sitting at the table talking to her while drinking alcohol and smoking marijuana when he suddenly became very pale and looked off to the right and then had a syncopal episode.  He did not have any seizures.  She held him up to make sure that he would not fall and hurt himself.    Roland Hussein is a 72 y.o. male who presents by ambulance after having a syncopal episode the evening while drinking the ricks and smoking marijuana at the same time.  The patient states that he feels improved now.  He denies ever having any chest pain extremity swelling PND orthopnea headache unilateral weakness or numbness.  The patient has no medical problems and does not take any medications on a daily basis.  According to him and his family he is very healthy.  He states he does drink a few beers daily but does not often combine alcohol and marijuana as he did tonight.    PAST MEDICAL HISTORY       SURGICAL HISTORY   has a past surgical history that includes repair, knee, acl (Left) and hernia repair.    FAMILY HISTORY  History reviewed. No pertinent family history.    SOCIAL HISTORY  Social History     Tobacco Use    Smoking status: Former     Packs/day: 1.00     Years: 15.00     Pack years: 15.00     " "Types: Cigarettes     Quit date:      Years since quittin.2    Smokeless tobacco: Never   Vaping Use    Vaping Use: Never used   Substance and Sexual Activity    Alcohol use: Yes     Comment: occ    Drug use: Yes     Types: Inhaled     Comment: marijuana    Sexual activity: Not on file       CURRENT MEDICATIONS  Home Medications    **Home medications have not yet been reviewed for this encounter**         ALLERGIES  Allergies   Allergen Reactions    Penicillins Hives       PHYSICAL EXAM  VITAL SIGNS: /72   Pulse 62   Temp 36 °C (96.8 °F) (Temporal)   Resp 16   Ht 1.753 m (5' 9\")   Wt 74.8 kg (165 lb)   SpO2 94% BMI 24.37 kg/m²    Constitutional: Well developed, Well nourished, No acute distress, Non-toxic appearance.   HEENT: Normocephalic, Atraumatic,  external ears normal, pharynx pink,  Mucous  Membranes moist, No rhinorrhea or mucosal edema  Eyes: PERRL, EOMI, Conjunctiva normal, No discharge.   Neck: Normal range of motion, No tenderness, Supple, No stridor.   Lymphatic: No lymphadenopathy    Cardiovascular: Regular Rate and Rhythm, No murmurs,  rubs, or gallops.   Thorax & Lungs: Lungs clear to auscultation bilaterally, No respiratory distress, No wheezes, rhales or rhonchi, No chest wall tenderness.   Abdomen: Bowel sounds normal, Soft, non tender, non distended,  No pulsatile masses., no rebound guarding or peritoneal signs.   Skin: Warm, Dry, No erythema, No rash,   Back:  No CVA tenderness,  No spinal tenderness, bony crepitance step offs or instability.   Extremities: Equal, intact distal pulses, No cyanosis, clubbing or edema,  No tenderness.   Musculoskeletal: Good range of motion in all major joints. No tenderness to palpation or major deformities noted.   Neurologic: Alert & oriented x 3, Cranial nerves II-XII intact, Equal strength and sensation upper and lower extremities bilaterally,  No focal deficits noted. NIH 0  Psychiatric: Affect normal, Judgment normal, Mood normal. No " suicidal or homicidal ideation         DIAGNOSTIC STUDIES / PROCEDURES  EKG  I have independently interpreted this EKG  See below    LABS  Results for orders placed or performed during the hospital encounter of 03/31/23   CBC WITH DIFFERENTIAL   Result Value Ref Range    WBC 5.9 4.8 - 10.8 K/uL    RBC 4.60 (L) 4.70 - 6.10 M/uL    Hemoglobin 15.0 14.0 - 18.0 g/dL    Hematocrit 43.8 42.0 - 52.0 %    MCV 95.2 81.4 - 97.8 fL    MCH 32.6 27.0 - 33.0 pg    MCHC 34.2 33.7 - 35.3 g/dL    RDW 45.1 35.9 - 50.0 fL    Platelet Count 179 164 - 446 K/uL    MPV 10.2 9.0 - 12.9 fL    Neutrophils-Polys 44.70 44.00 - 72.00 %    Lymphocytes 38.30 22.00 - 41.00 %    Monocytes 8.80 0.00 - 13.40 %    Eosinophils 6.40 0.00 - 6.90 %    Basophils 0.80 0.00 - 1.80 %    Immature Granulocytes 1.00 (H) 0.00 - 0.90 %    Nucleated RBC 0.00 /100 WBC    Neutrophils (Absolute) 2.65 1.82 - 7.42 K/uL    Lymphs (Absolute) 2.27 1.00 - 4.80 K/uL    Monos (Absolute) 0.52 0.00 - 0.85 K/uL    Eos (Absolute) 0.38 0.00 - 0.51 K/uL    Baso (Absolute) 0.05 0.00 - 0.12 K/uL    Immature Granulocytes (abs) 0.06 0.00 - 0.11 K/uL    NRBC (Absolute) 0.00 K/uL   COMP METABOLIC PANEL   Result Value Ref Range    Sodium 140 135 - 145 mmol/L    Potassium 3.7 3.6 - 5.5 mmol/L    Chloride 105 96 - 112 mmol/L    Co2 23 20 - 33 mmol/L    Anion Gap 12.0 7.0 - 16.0    Glucose 86 65 - 99 mg/dL    Bun 14 8 - 22 mg/dL    Creatinine 0.73 0.50 - 1.40 mg/dL    Calcium 8.8 8.4 - 10.2 mg/dL    AST(SGOT) 20 12 - 45 U/L    ALT(SGPT) 17 2 - 50 U/L    Alkaline Phosphatase 50 30 - 99 U/L    Total Bilirubin 0.4 0.1 - 1.5 mg/dL    Albumin 3.9 3.2 - 4.9 g/dL    Total Protein 6.4 6.0 - 8.2 g/dL    Globulin 2.5 1.9 - 3.5 g/dL    A-G Ratio 1.6 g/dL   TROPONIN   Result Value Ref Range    Troponin T 10 6 - 19 ng/L   PRTOTHROMBIN TIME (INR)   Result Value Ref Range    PT 13.2 12.0 - 14.6 sec    INR 1.01 0.87 - 1.13   APTT   Result Value Ref Range    APTT 20.8 (L) 24.7 - 36.0 sec   ETHYL ALCOHOL  (BLOOD)   Result Value Ref Range    Diagnostic Alcohol 64.0 (H) <10.1 mg/dL   CORRECTED CALCIUM   Result Value Ref Range    Correct Calcium 8.9 8.5 - 10.5 mg/dL   ESTIMATED GFR   Result Value Ref Range    GFR (CKD-EPI) 95 >60 mL/min/1.73 m 2   TROPONIN   Result Value Ref Range    Troponin T 10 6 - 19 ng/L   EKG   Result Value Ref Range    Report       [facility] Emergency Dept.    Test Date:  2023  Pt Name:    JULIAN CISNEROS                Department: [facility]  MRN:        [id number]                      [facility]  Gender:     Male                         Technician: [id number]  :        1950                   Requested By:PADMINI MASON  Order #:    [id number]                    Reading MD: PADMINI MASON MD    Measurements  Intervals                                Axis  Rate:       61                           P:          9  WA:         247                          QRS:        35  QRSD:       152                          T:          6  QT:         441  QTc:        445    Interpretive Statements  Sinus rhythm  Prolonged WA interval  Right bundle branch block  Compared to ECG 2017 11:26:06  First degree AV block now present  Right bundle-branch block now present  Electronically Signed On 3- 21:47:47 PDT by PADMINI MASON MD           RADIOLOGY  I have independently interpreted the diagnostic imaging associated with this visit and am waiting the final reading from the radiologist.   My preliminary interpretation is as follows: Chest x-ray shows no infiltrates or effusions  Radiologist interpretation:   CT-HEAD W/O   Final Result         1. No acute intracranial abnormality. No evidence of acute intracranial hemorrhage or mass lesion.                     DX-CHEST-PORTABLE (1 VIEW)   Final Result         Mild bibasilar opacities, likely atelectasis.            COURSE & MEDICAL DECISION MAKING    ED Observation Status? Yes; I am placing the patient in to an observation  status due to a diagnostic uncertainty as well as therapeutic intensity. Patient placed in observation status at 9:23 PM, 3/31/2023.     Observation plan is as follows: CT head without contrast, EKG CBC CMP and troponin were all ordered to evaluate the patient's symptoms.    Upon Reevaluation, the patient's condition has: Improved; and will be discharged.    Patient discharged from ED Observation status at 12:02 AM  (Time) 4/1/23(Date).     INITIAL ASSESSMENT, COURSE AND PLAN  Care Narrative: This is a 72-year-old male who had a few beers and a shot of bourbon and then a few marijuana cigarettes tonight.  He was visiting with his daughter and became unresponsive and passed out for a few minutes.  He did not have any seizure activity.  The patient states that he thinks he just might of overdone it but denies having any chest pain headache unilateral weakness or numbness.  He currently feels like he has a dry mouth but has no other symptoms.  His daughter made sure he did not fall and hit his head when this happened.  On physical exam he has a normal neurologic exam normal lung sounds normal cardiac exam.  I have ordered CT of the head as well as EKG CBC CMP and troponin to further evaluate his symptoms.  HYDRATION: Based on the patient's presentation of Dehydration the patient was given IV fluids. IV Hydration was used because oral hydration was not adequate alone. Upon recheck following hydration, the patient was improved.      ADDITIONAL PROBLEM LIST  None  DISPOSITION AND DISCUSSIONS    Patient's white blood cell count is normal at 5.9 hemoglobin 15 platelet count 179.  His differential is normal.  Patient's diagnostic alcohol level is elevated at 64.  His comprehensive metabolic panel is normal kidney function normal liver function and normal electrolytes with no anion gap.  His coags are normal.  Troponin is 10.  His heart score at this time is 3 with a 0.9 to 1.7%  risk of MACE in the next month.    Patient's  CT head is negative for any intracranial process.  His chest x-ray shows atelectasis but no pleural effusions or aspirations.    The patient is resting comfortably on recheck with no focal deficits or chest pain.  I have spoken with his daughter and son and explained to them that as long as his second troponin is negative his orthostatics are negative and he is improved and able to ambulate around he could be discharged home this morning.  The son left his numbers with us so we can call him to come get his father if everything turns out negative.    The patient's orthostatics were negative after his IV fluid was in and he was able to ambulate to the bathroom without assistance or any symptoms.    Patient's second troponin is still 10.  He has no chest pains or shortness of breath and is no longer dizzy.  I will discharge him home in the care of his family in stable condition.  We had a discussion about never mixing marijuana and alcohol again and he is agreeable to this.  He states [facility] and return for worsening symptoms.    I have discussed management of the patient with the following physicians and GAIL's:  none    Discussion of management with other QHP or appropriate source(s):  none     Escalation of care considered, and ultimately not performed:acute inpatient care management, however at this time, the patient is most appropriate for outpatient management    Barriers to care at this time, including but not limited to: none.     Decision tools and prescription drugs considered including, but not limited to: NIH Stroke Scale 0 and HEART Score 3 .  The patient will return for new or worsening symptoms and is stable at the time of discharge.    The patient is referred to a primary physician for blood pressure management, diabetic screening, and for all other preventative health concerns.      DISPOSITION:  Patient will be discharged home in stable condition.    FOLLOW  UP:  Gerry Zhu M.D.  [address] [place] [place] [id number] [phone]    Call in 3 days  for recheck    [facility], Emergency Dept  [address]  [place] [id number] [phone]    As needed, If symptoms worsen      OUTPATIENT MEDICATIONS:  New Prescriptions    No medications on file       FINAL DIAGNOSIS  1. Syncope, unspecified syncope type    2. Polysubstance abuse (HCC)           Electronically signed by: Marilia Mckeon M.D., 3/31/2023 9:21 PM

## 2023-04-01 NOTE — ED TRIAGE NOTES
"Chief Complaint   Patient presents with    Syncope     Pt was a family event where he \"stared into space\" and then fainted per family report  Pt was assisted to by family member to keep him from falling to the ground   Pt states that the dizziness is still present and worse with movement   Pt denies any medical history or daily medications  Pt was drinking alcohol and smoking marijuana        /72   Pulse 62   Temp 36 °C (96.8 °F) (Temporal)   Resp 16   Ht 1.753 m (5' 9\")   Wt 74.8 kg (165 lb)   SpO2 94%   BMI 24.37 kg/m²     "

## 2023-04-01 NOTE — ED NOTES
Patient discharged in ambulatory state. Patient verbally confirmed understanding of discharge paperwork and education provided.

## 2023-04-01 NOTE — ED NOTES
Report given and Ovidio aware that IVFs are still running. Family and pt updated on transfer of care no questions at this time.

## 2023-04-01 NOTE — ED NOTES
Pt upon returning from CT requested lights to be turned off to be able to rest. Lights turned down, call light in reach and pt on monitor.

## 2023-08-24 ENCOUNTER — TELEPHONE (OUTPATIENT)
Dept: HEALTH INFORMATION MANAGEMENT | Facility: OTHER | Age: 73
End: 2023-08-24
Payer: MEDICARE

## 2023-10-31 ENCOUNTER — OFFICE VISIT (OUTPATIENT)
Dept: MEDICAL GROUP | Facility: PHYSICIAN GROUP | Age: 73
End: 2023-10-31
Payer: MEDICARE

## 2023-10-31 VITALS
SYSTOLIC BLOOD PRESSURE: 120 MMHG | DIASTOLIC BLOOD PRESSURE: 80 MMHG | OXYGEN SATURATION: 99 % | BODY MASS INDEX: 24.71 KG/M2 | WEIGHT: 166.8 LBS | TEMPERATURE: 97.3 F | HEIGHT: 69 IN | HEART RATE: 63 BPM

## 2023-10-31 DIAGNOSIS — J45.20 MILD INTERMITTENT ASTHMA WITHOUT COMPLICATION: ICD-10-CM

## 2023-10-31 DIAGNOSIS — H90.3 SENSORINEURAL HEARING LOSS, BILATERAL: ICD-10-CM

## 2023-10-31 DIAGNOSIS — B02.9 HERPES ZOSTER WITHOUT COMPLICATION: ICD-10-CM

## 2023-10-31 DIAGNOSIS — N52.9 VASCULOGENIC ERECTILE DYSFUNCTION, UNSPECIFIED VASCULOGENIC ERECTILE DYSFUNCTION TYPE: ICD-10-CM

## 2023-10-31 PROBLEM — H35.363 DRUSEN (DEGENERATIVE) OF MACULA, BILATERAL: Status: ACTIVE | Noted: 2023-10-31

## 2023-10-31 PROBLEM — E78.5 HYPERLIPIDEMIA: Status: ACTIVE | Noted: 2023-10-31

## 2023-10-31 PROCEDURE — 3074F SYST BP LT 130 MM HG: CPT

## 2023-10-31 PROCEDURE — 99214 OFFICE O/P EST MOD 30 MIN: CPT

## 2023-10-31 PROCEDURE — 3079F DIAST BP 80-89 MM HG: CPT

## 2023-10-31 RX ORDER — SILDENAFIL 50 MG/1
25 TABLET, FILM COATED ORAL PRN
Qty: 20 TABLET | Refills: 3 | Status: SHIPPED | OUTPATIENT
Start: 2023-10-31

## 2023-10-31 RX ORDER — SILDENAFIL 50 MG/1
25 TABLET, FILM COATED ORAL
COMMUNITY
Start: 2023-08-29 | End: 2023-10-31 | Stop reason: SDUPTHER

## 2023-10-31 ASSESSMENT — ENCOUNTER SYMPTOMS
VOMITING: 0
CHILLS: 0
HEADACHES: 0
DIARRHEA: 0
MYALGIAS: 0
COUGH: 0
DIZZINESS: 0
WEIGHT LOSS: 0
BLURRED VISION: 0
ABDOMINAL PAIN: 0
WEAKNESS: 0
CONSTIPATION: 0
NAUSEA: 0
FEVER: 0
SHORTNESS OF BREATH: 0

## 2023-10-31 ASSESSMENT — PATIENT HEALTH QUESTIONNAIRE - PHQ9: CLINICAL INTERPRETATION OF PHQ2 SCORE: 0

## 2023-10-31 ASSESSMENT — FIBROSIS 4 INDEX: FIB4 SCORE: 2.03

## 2023-10-31 NOTE — ASSESSMENT & PLAN NOTE
Chronic condition stable we will refill Viagra as requested  -Viagra 25 mg 1 hour prior to sexual intercourse once daily if needed for erectile dysfunction

## 2023-10-31 NOTE — ASSESSMENT & PLAN NOTE
Chronic condition stable at this time  -Continue to use albuterol 108 mcg 2 puffs every 6 hours for bronchospasm if needed  -Discussed use of ICS daily, patient declines at this time

## 2023-10-31 NOTE — PROGRESS NOTES
"Subjective:     CC:  Diagnoses of Mild intermittent asthma without complication, Vasculogenic erectile dysfunction, unspecified vasculogenic erectile dysfunction type, Herpes zoster without complication, and Sensorineural hearing loss, bilateral were pertinent to this visit.    HISTORY OF THE PRESENT ILLNESS: Patient is a 73 y.o. male. This pleasant patient is here today to establish care and discuss the following problems:    Problem   Vasculogenic Erectile Dysfunction    Reports history of ED. Uses viagra which is working well. Would like refill. Denies side effects.     Herpes Zoster    Chronic, no recent outbreaks  Uses antiviral (uncertain of name) for outbreaks     Hyperlipidemia   Sensorineural Hearing Loss, Bilateral    Chronic in nature  Reports to wear hearing aids       Drusen (Degenerative) of Macula, Bilateral   Mild Intermittent Asthma Without Complication    Reports to be seasonal  Using albuterol a couple times per day  Has used symbicort in the past         Health Maintenance: Declines    ROS:   Review of Systems   Constitutional:  Negative for chills, fever, malaise/fatigue and weight loss.   Eyes:  Negative for blurred vision.   Respiratory:  Negative for cough and shortness of breath.    Cardiovascular:  Negative for chest pain.   Gastrointestinal:  Negative for abdominal pain, constipation, diarrhea, nausea and vomiting.   Musculoskeletal:  Negative for myalgias.   Neurological:  Negative for dizziness, weakness and headaches.         Objective:     Exam: /80 (BP Location: Left arm, Patient Position: Sitting, BP Cuff Size: Adult)   Pulse 63   Temp 36.3 °C (97.3 °F) (Temporal)   Ht 1.753 m (5' 9\")   Wt 75.7 kg (166 lb 12.8 oz)   SpO2 99%  Body mass index is 24.63 kg/m².    Physical Exam  Constitutional:       Appearance: Normal appearance.   HENT:      Head: Normocephalic.   Eyes:      Conjunctiva/sclera: Conjunctivae normal.      Pupils: Pupils are equal, round, and reactive to light. "   Cardiovascular:      Rate and Rhythm: Normal rate and regular rhythm.      Heart sounds: No murmur heard.  Pulmonary:      Effort: Pulmonary effort is normal. No respiratory distress.      Breath sounds: Normal breath sounds.   Musculoskeletal:         General: Normal range of motion.   Skin:     General: Skin is warm and dry.   Neurological:      General: No focal deficit present.      Mental Status: He is alert and oriented to person, place, and time.   Psychiatric:         Mood and Affect: Mood normal.         Behavior: Behavior normal.           Labs: No recent [facility] he reports they are all normal    Assessment & Plan: Medical Decision Making   73 y.o. male with the following -    Problem List Items Addressed This Visit       Mild intermittent asthma without complication     Chronic condition stable at this time  -Continue to use albuterol 108 mcg 2 puffs every 6 hours for bronchospasm if needed  -Discussed use of ICS daily, patient declines at this time         Vasculogenic erectile dysfunction     Chronic condition stable we will refill Viagra as requested  -Viagra 25 mg 1 hour prior to sexual intercourse once daily if needed for erectile dysfunction         Relevant Medications    sildenafil citrate (VIAGRA) 50 MG tablet    Herpes zoster     Chronic condition stable  - Continue to use antiviral if needed for outbreaks         Sensorineural hearing loss, bilateral     Chronic condition stable  Promoting and recommending use of hearing aids            Differential diagnosis, natural history, supportive care, and indications for immediate follow-up discussed.  Shared decision making approach was utilized, and patient is amendable with plan of care.  Patient understands to return to clinic or go to the emergency department if symptoms worsen. All questions and concerns addressed to the best of my knowledge.      Return in about 1 year (around 10/31/2024).    Please note that this  dictation was created using voice recognition software. I have made every reasonable attempt to correct obvious errors, but I expect that there are errors of grammar and possibly content that I did not discover before finalizing the note.

## 2024-06-17 ENCOUNTER — TELEPHONE (OUTPATIENT)
Dept: HEALTH INFORMATION MANAGEMENT | Facility: OTHER | Age: 74
End: 2024-06-17

## 2024-08-19 ENCOUNTER — TELEPHONE (OUTPATIENT)
Dept: MEDICAL GROUP | Facility: PHYSICIAN GROUP | Age: 74
End: 2024-08-19

## 2024-08-19 DIAGNOSIS — N52.9 VASCULOGENIC ERECTILE DYSFUNCTION, UNSPECIFIED VASCULOGENIC ERECTILE DYSFUNCTION TYPE: ICD-10-CM

## 2024-08-19 RX ORDER — SILDENAFIL 50 MG/1
25 TABLET, FILM COATED ORAL PRN
Qty: 20 TABLET | Refills: 3 | Status: SHIPPED | OUTPATIENT
Start: 2024-08-19

## 2024-08-19 RX ORDER — SILDENAFIL 50 MG/1
25 TABLET, FILM COATED ORAL PRN
Qty: 20 TABLET | Refills: 3 | Status: SHIPPED | OUTPATIENT
Start: 2024-08-19 | End: 2024-08-19 | Stop reason: SDUPTHER

## 2024-08-19 NOTE — TELEPHONE ENCOUNTER
Patient is here to ask for a refill of Sildemfil he is totally out and would like it refilled asap. Please call the patient with any question. Thank you

## 2025-06-02 ENCOUNTER — TELEPHONE (OUTPATIENT)
Dept: FAMILY PLANNING/WOMEN'S HEALTH CLINIC | Facility: PHYSICIAN GROUP | Age: 75
End: 2025-06-02
Payer: MEDICARE

## 2025-06-02 NOTE — TELEPHONE ENCOUNTER
Message: Called and left message for patient to schedule annual Comprehensive Health Assessment visit with the [facility]. Left phone number for patient to call [employer] at [phone] to schedule.

## 2025-06-05 ENCOUNTER — TELEPHONE (OUTPATIENT)
Dept: HEALTH INFORMATION MANAGEMENT | Facility: OTHER | Age: 75
End: 2025-06-05
Payer: MEDICARE

## 2025-06-10 ENCOUNTER — OFFICE VISIT (OUTPATIENT)
Dept: MEDICAL GROUP | Facility: PHYSICIAN GROUP | Age: 75
End: 2025-06-10
Payer: MEDICARE

## 2025-06-10 VITALS
HEART RATE: 65 BPM | TEMPERATURE: 97.5 F | HEIGHT: 69 IN | BODY MASS INDEX: 25.36 KG/M2 | SYSTOLIC BLOOD PRESSURE: 120 MMHG | WEIGHT: 171.2 LBS | DIASTOLIC BLOOD PRESSURE: 60 MMHG | OXYGEN SATURATION: 97 %

## 2025-06-10 DIAGNOSIS — R22.0 LEFT FACIAL SWELLING: Primary | ICD-10-CM

## 2025-06-10 DIAGNOSIS — N52.9 VASCULOGENIC ERECTILE DYSFUNCTION, UNSPECIFIED VASCULOGENIC ERECTILE DYSFUNCTION TYPE: ICD-10-CM

## 2025-06-10 DIAGNOSIS — J45.20 MILD INTERMITTENT ASTHMA WITHOUT COMPLICATION: ICD-10-CM

## 2025-06-10 PROCEDURE — 3078F DIAST BP <80 MM HG: CPT

## 2025-06-10 PROCEDURE — 3074F SYST BP LT 130 MM HG: CPT

## 2025-06-10 PROCEDURE — 99213 OFFICE O/P EST LOW 20 MIN: CPT

## 2025-06-10 RX ORDER — SILDENAFIL 50 MG/1
25 TABLET, FILM COATED ORAL PRN
Qty: 20 TABLET | Refills: 3 | Status: SHIPPED | OUTPATIENT
Start: 2025-06-10

## 2025-06-10 RX ORDER — SILDENAFIL 50 MG/1
25 TABLET, FILM COATED ORAL PRN
Qty: 20 TABLET | Refills: 3 | Status: SHIPPED | OUTPATIENT
Start: 2025-06-10 | End: 2025-06-10

## 2025-06-10 ASSESSMENT — ENCOUNTER SYMPTOMS
BLURRED VISION: 0
FEVER: 0
CHILLS: 0
HEADACHES: 0
NAUSEA: 0
DIARRHEA: 0
DIZZINESS: 0
WEAKNESS: 0
CONSTIPATION: 0
COUGH: 0
WEIGHT LOSS: 0
ABDOMINAL PAIN: 0
VOMITING: 0
SHORTNESS OF BREATH: 0
MYALGIAS: 0

## 2025-06-10 ASSESSMENT — PATIENT HEALTH QUESTIONNAIRE - PHQ9: CLINICAL INTERPRETATION OF PHQ2 SCORE: 0

## 2025-06-10 NOTE — PROGRESS NOTES
"Verbal consent was acquired by the patient to use [employer] ambient listening note generation during this visit  Subjective:     CC:  The primary encounter diagnosis was Left facial swelling. Diagnoses of Vasculogenic erectile dysfunction, unspecified vasculogenic erectile dysfunction type and Mild intermittent asthma without complication were also pertinent to this visit.    HISTORY OF THE PRESENT ILLNESS: Patient is a 74 y.o. male.   Problem   Left Facial Swelling       History of Present Illness  The patient presents with erectile dysfunction, facial edema, and asthma.    Erectile Dysfunction  - He has been managing his erectile dysfunction with sildenafil, taking half a tablet as needed, which he reports as effective.  - He is seeking a refill of this medication.    Facial Edema  - The patient has been experiencing facial edema, which his dentist suspects may be related to his salivary glands.  - He is requesting a prescription for lemon drops to alleviate this symptom.    Asthma  - His asthma is well controlled with budesonide and albuterol.  - He does not require any refills at this time.    ROS:   Review of Systems   Constitutional:  Negative for chills, fever, malaise/fatigue and weight loss.   Eyes:  Negative for blurred vision.   Respiratory:  Negative for cough and shortness of breath.    Cardiovascular:  Negative for chest pain.   Gastrointestinal:  Negative for abdominal pain, constipation, diarrhea, nausea and vomiting.   Musculoskeletal:  Negative for myalgias.   Neurological:  Negative for dizziness, weakness and headaches.         Objective:     Exam: /60 (BP Location: Left arm, Patient Position: Sitting, BP Cuff Size: Adult)   Pulse 65   Temp 36.4 °C (97.5 °F) (Temporal)   Ht 1.753 m (5' 9\")   Wt 77.7 kg (171 lb 3.2 oz)   SpO2 97%  Body mass index is 25.28 kg/m².    Physical Exam  Constitutional:       Appearance: Normal appearance.   HENT:      Head: Normocephalic.   Eyes:      " Conjunctiva/sclera: Conjunctivae normal.      Pupils: Pupils are equal, round, and reactive to light.   Pulmonary:      Effort: Pulmonary effort is normal.   Musculoskeletal:         General: Normal range of motion.   Skin:     General: Skin is warm and dry.   Neurological:      General: No focal deficit present.      Mental Status: He is alert and oriented to person, place, and time.   Psychiatric:         Mood and Affect: Mood normal.         Behavior: Behavior normal.           Labs:   No visits with results within 1 Month(s) from this visit.   Latest known visit with results is:   Admission on 03/31/2023, Discharged on 04/01/2023   Component Date Value    WBC 03/31/2023 5.9     RBC 03/31/2023 4.60 (L)     Hemoglobin 03/31/2023 15.0     Hematocrit 03/31/2023 43.8     MCV 03/31/2023 95.2     MCH 03/31/2023 32.6     MCHC 03/31/2023 34.2     RDW 03/31/2023 45.1     Platelet Count 03/31/2023 179     MPV 03/31/2023 10.2     Neutrophils-Polys 03/31/2023 44.70     Lymphocytes 03/31/2023 38.30     Monocytes 03/31/2023 8.80     Eosinophils 03/31/2023 6.40     Basophils 03/31/2023 0.80     Immature Granulocytes 03/31/2023 1.00 (H)     Nucleated RBC 03/31/2023 0.00     Neutrophils (Absolute) 03/31/2023 2.65     Lymphs (Absolute) 03/31/2023 2.27     Monos (Absolute) 03/31/2023 0.52     Eos (Absolute) 03/31/2023 0.38     Baso (Absolute) 03/31/2023 0.05     Immature Granulocytes (a* 03/31/2023 0.06     NRBC (Absolute) 03/31/2023 0.00     Sodium 03/31/2023 140     Potassium 03/31/2023 3.7     Chloride 03/31/2023 105     Co2 03/31/2023 23     Anion Gap 03/31/2023 12.0     Glucose 03/31/2023 86     Bun 03/31/2023 14     Creatinine 03/31/2023 0.73     Calcium 03/31/2023 8.8     AST(SGOT) 03/31/2023 20     ALT(SGPT) 03/31/2023 17     Alkaline Phosphatase 03/31/2023 50     Total Bilirubin 03/31/2023 0.4     Albumin 03/31/2023 3.9     Total Protein 03/31/2023 6.4     Globulin 03/31/2023 2.5     A-G Ratio 03/31/2023 1.6     Troponin T  2023 10     PT 2023 13.2     INR 2023 1.01     APTT 2023 20.8 (L)     Diagnostic Alcohol 2023 64.0 (H)     Report 2023                      Value:[facility]    Test Date:  2023  Pt Name:    JULIAN CISNEROS                Department: [facility]  MRN:        [id number]                      [facility]  Gender:     Male                         Technician: [id number]  :        [id number]                   Requested By:PADMINI MASON  Order #:    [id number]                    Reading MD: PADMINI MASON MD    Measurements  Intervals                                Axis  Rate:       61                           P:          9  AL:         247                          QRS:        35  QRSD:       152                          T:          6  QT:         441  QTc:        445    Interpretive Statements  Sinus rhythm  Prolonged AL interval  Right bundle branch block  Compared to ECG 2017 11:26:06  First degree AV block now present  Right bundle-branch block now present  Electronically Signed On 3- 21:47:47 PDT by PADMINI MASON MD      Correct Calcium 2023 8.9     GFR (CKD-EPI) 2023 95     Troponin T 2023 10          Assessment & Plan: Medical Decision Making   74 y.o. male with the following -    Assessment & Plan  1. Erectile Dysfunction.  - Currently using sildenafil 50 mg, taking half a tablet as needed, which is reported to be effective.  - Prescription for 20 tablets of sildenafil 50 mg will be sent to the pharmacy.  - Advised to continue using half a tablet as needed.    2. Facial Swelling.  - Advised to suck on candy to alleviate swelling.  - Recommended to consult his dentist for further evaluation.  - Referral to a specialist will be considered based on the dentist's recommendation if swelling persists.            Problem List Items Addressed This Visit       Mild intermittent asthma without complication    Relevant  Medications    Budesonide-Formoterol Fumarate (SYMBICORT INH)    Vasculogenic erectile dysfunction    Relevant Medications    sildenafil citrate (VIAGRA) 50 MG tablet    Left facial swelling - Primary       Differential diagnosis, natural history, supportive care, and indications for immediate follow-up discussed.  Shared decision making approach was utilized, and patient is amendable with plan of care.  Patient understands to return to clinic or go to the emergency department if symptoms worsen. All questions and concerns addressed to the best of my knowledge.      Return if symptoms worsen or fail to improve.    Please note that this dictation was created using voice recognition software. I have made every reasonable attempt to correct obvious errors, but I expect that there are errors of grammar and possibly content that I did not discover before finalizing the note.

## 2025-06-12 ENCOUNTER — APPOINTMENT (OUTPATIENT)
Dept: FAMILY PLANNING/WOMEN'S HEALTH CLINIC | Facility: PHYSICIAN GROUP | Age: 75
End: 2025-06-12
Payer: MEDICARE

## 2025-06-12 DIAGNOSIS — E78.5 HYPERLIPIDEMIA, UNSPECIFIED HYPERLIPIDEMIA TYPE: Primary | ICD-10-CM

## 2025-06-12 DIAGNOSIS — J45.20 MILD INTERMITTENT ASTHMA WITHOUT COMPLICATION: ICD-10-CM

## 2025-06-16 NOTE — TELEPHONE ENCOUNTER
Message: Called and left message for patient to schedule annual Comprehensive Health Assessment visit with the [facility]. Left phone number for patient to call [employer] at [phone] to schedule.